# Patient Record
Sex: MALE | Race: WHITE | NOT HISPANIC OR LATINO | Employment: UNEMPLOYED | RURAL
[De-identification: names, ages, dates, MRNs, and addresses within clinical notes are randomized per-mention and may not be internally consistent; named-entity substitution may affect disease eponyms.]

---
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
## 2024-04-02 ENCOUNTER — OFFICE VISIT (OUTPATIENT)
Dept: FAMILY MEDICINE | Facility: CLINIC | Age: 19
End: 2024-04-02
Payer: MEDICAID

## 2024-04-02 VITALS
SYSTOLIC BLOOD PRESSURE: 109 MMHG | HEART RATE: 94 BPM | OXYGEN SATURATION: 97 % | RESPIRATION RATE: 20 BRPM | TEMPERATURE: 99 F | BODY MASS INDEX: 16.86 KG/M2 | HEIGHT: 69 IN | WEIGHT: 113.81 LBS | DIASTOLIC BLOOD PRESSURE: 69 MMHG

## 2024-04-02 DIAGNOSIS — J02.9 PHARYNGITIS, UNSPECIFIED ETIOLOGY: Primary | ICD-10-CM

## 2024-04-02 LAB
CTP QC/QA: YES
MOLECULAR STREP A: NEGATIVE

## 2024-04-02 PROCEDURE — 99213 OFFICE O/P EST LOW 20 MIN: CPT | Mod: ,,, | Performed by: NURSE PRACTITIONER

## 2024-04-02 PROCEDURE — 3078F DIAST BP <80 MM HG: CPT | Mod: CPTII,,, | Performed by: NURSE PRACTITIONER

## 2024-04-02 PROCEDURE — 87651 STREP A DNA AMP PROBE: CPT | Mod: RHCUB | Performed by: NURSE PRACTITIONER

## 2024-04-02 PROCEDURE — 3074F SYST BP LT 130 MM HG: CPT | Mod: CPTII,,, | Performed by: NURSE PRACTITIONER

## 2024-04-02 PROCEDURE — 3008F BODY MASS INDEX DOCD: CPT | Mod: CPTII,,, | Performed by: NURSE PRACTITIONER

## 2024-04-02 RX ORDER — CEFPROZIL 250 MG/1
250 TABLET, FILM COATED ORAL EVERY 12 HOURS
Qty: 14 TABLET | Refills: 0 | Status: SHIPPED | OUTPATIENT
Start: 2024-04-02

## 2024-04-02 NOTE — LETTER
April 2, 2024      [facility] [address] [place] [address]  Phone: [phone]  Fax: [phone]       Patient: Keely Manley   YOB: 2005  Date of Visit: 04/02/2024    To Whom It May Concern:    LILLIAN Manley  was at [facility] on 04/02/2024. The patient may return to work/school on 4/3/24 with no restrictions. If you have any questions or concerns, or if I can be of further assistance, please do not hesitate to contact me.    Sincerely,    CLAY Christianson

## 2024-04-02 NOTE — PATIENT INSTRUCTIONS
rapid strep is negative. Will treat with cefprozil 250mg bid #14, warm salt water gargles, throat lozenges, drink lots of  fluids.

## 2025-01-27 ENCOUNTER — OFFICE VISIT (OUTPATIENT)
Dept: FAMILY MEDICINE | Facility: CLINIC | Age: 20
End: 2025-01-27

## 2025-01-27 VITALS
RESPIRATION RATE: 18 BRPM | DIASTOLIC BLOOD PRESSURE: 66 MMHG | WEIGHT: 119.38 LBS | OXYGEN SATURATION: 100 % | HEART RATE: 86 BPM | BODY MASS INDEX: 17.68 KG/M2 | SYSTOLIC BLOOD PRESSURE: 109 MMHG | TEMPERATURE: 98 F | HEIGHT: 69 IN

## 2025-01-27 DIAGNOSIS — L30.9 ECZEMA, UNSPECIFIED TYPE: Primary | ICD-10-CM

## 2025-01-27 PROCEDURE — 99213 OFFICE O/P EST LOW 20 MIN: CPT | Mod: ,,, | Performed by: NURSE PRACTITIONER

## 2025-01-27 RX ORDER — TRIAMCINOLONE ACETONIDE 1 MG/G
CREAM TOPICAL 2 TIMES DAILY
Qty: 80 G | Refills: 0 | Status: SHIPPED | OUTPATIENT
Start: 2025-01-27

## 2025-01-27 NOTE — LETTER
January 27, 2025      [facility] [address] [place] [address]  Phone: [phone]  Fax: [phone]       Patient: Keely Manley   YOB: 2005  Date of Visit: 01/27/2025    To Whom It May Concern:    LILLIAN Manley  was at [facility] on 01/27/2025. The patient may return to work/school on 1/28/25 with no restrictions. If you have any questions or concerns, or if I can be of further assistance, please do not hesitate to contact me.    Sincerely,    CLAY Christianson

## 2025-01-27 NOTE — PROGRESS NOTES
CLAY Christianson   [facility]  [address]   [place] [address]     PATIENT NAME: Keely Manley  : 2005  DATE: 25  MRN: [id number]      Billing Provider: CLAY Christianson  Level of Service: [place] OFFICE/OUTPT VISIT, EST, LEVL III, 20-29 MIN  Patient PCP Information       Provider PCP Type    CLAY Christianson General              Reason for Visit / Chief Complaint: Rash (Itchy rash on hands noticed about 2 weeks before [place] and now it is on his face. It was raised when it first started and now it is just dry and itches)       History of Present Illness / Problem Focused Workflow     History of Present Illness    CHIEF COMPLAINT:  Patient presents today for rashes on hands and face.    HISTORY OF PRESENT ILLNESS:  He developed a rash on his hands and face approximately two weeks before [place]. The rash has since spread to his ears and is beginning to affect his eyelids. 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 Despite implementing protective measures including industrial style Nitrol gloves and covering up, the rash has persisted. He denies any history of eczema or recent changes in products used.    CURRENT SYMPTOMS:  He presents with scattered rough red patches on right hand with one palm bump and right wrist involvement. Left hand shows large red scaly patch across entire dorsum and thumb. Facial involvement includes red scaly rash under left eye, in left nasal fold, across right cheekbone, and right nasolabial fold. Both ears are affected.    CURRENT TREATMENT:  He uses Gold Bond psoriasis medicated cream as needed during rash flares.      ROS:  General: -fever, -chills, -fatigue, -weight gain, -weight loss  Eyes: -vision changes, -redness, -discharge  ENT: -ear pain, -nasal congestion, -sore throat  Cardiovascular: -chest pain, -palpitations, -lower extremity  "edema  Respiratory: -cough, -shortness of breath  Gastrointestinal: -abdominal pain, -nausea, -vomiting, -diarrhea, -constipation, -blood in stool  Genitourinary: -dysuria, -hematuria, -frequency  Musculoskeletal: -joint pain, -muscle pain  Skin: +rash, +rash, -lesion  Neurological: -headache, -dizziness, -numbness, -tingling  Psychiatric: -anxiety, -depression, -sleep difficulty           Medical / Social / Family History     Past Medical History:   Diagnosis Date    Anxiety        History reviewed. No pertinent surgical history.    Social History    reports that he has never smoked. He has been exposed to tobacco smoke. He has never used smokeless tobacco. He reports that he does not drink alcohol and does not use drugs.    Family History  's family history includes No Known Problems in his father and mother.    Medications and Allergies     Medications  No outpatient medications have been marked as taking for the 1/27/25 encounter (Office Visit) with Merary Solorzano FNP.       Allergies  Review of patient's allergies indicates:   Allergen Reactions    Augmentin [amoxicillin-pot clavulanate] Nausea And Vomiting       Physical Examination     Vitals:    01/27/25 1553   BP: 109/66   Pulse: 86   Resp: 18   Temp: 98.4 °F (36.9 °C)   TempSrc: Oral   SpO2: 100%   Weight: 54.2 kg (119 lb 6.4 oz)   Height: 5' 9" (1.753 m)        Physical Exam    General: No acute distress. Well-developed. Well-nourished.  Eyes: EOMI. Sclerae anicteric.  HENT: Normocephalic. Atraumatic. Nares patent. Moist oral mucosa.  Ears: Bilateral TMs clear. Bilateral EACs clear.  Cardiovascular: Regular rate. Regular rhythm. No murmurs. No rubs. No gallops. Normal S1, S2.  Respiratory: Normal respiratory effort. Clear to auscultation bilaterally. No rales. No rhonchi. No wheezing.  Abdomen: Soft. Non-tender. Non-distended. Normoactive bowel sounds.  Musculoskeletal: No  obvious deformity.  Extremities: No lower extremity edema.  Neurological: " Alert & oriented x3. No slurred speech. Normal gait.  Psychiatric: Normal mood. Normal affect. Good insight. Good judgment.  Skin: Warm. Scattered rough erythematous patches on back of right hand. Erythematous plaques. Scaly skin. Erythematous scaly rash on left cheek under eye and nasolabial fold. Erythematous scaly rash on right cheek and nasolabial fold. Erythematous scaly rash on eyelid. Erythematous scaly rash on ears. Erythematous bump on palm of right hand.       Physical Exam     Assessment and Plan (including Health Maintenance)     :Assessment & Plan    IMPRESSION:  - Assessed rash on hands, face, and ears as likely eczema, exacerbated by dry winter weather and frequent hand washing  - Considered potential occupational exposure to coolant as contributing factor, but symptoms more consistent with eczema presentation  - Decided on combination topical steroid and moisturizer treatment to address inflammation and dryness    ECZEMA:  - Evaluated the patient's condition, observing scattered rough red patches on the back of the right hand, a large red scaly patch on the left hand, and red scaly rashes on both cheeks, nasolabial folds, and ears.  - Diagnosed the condition as eczema, noting its tendency to worsen in dry, cool weather and with heat exposure.  - Educated the patient about the importance of proper moisturizing routine for eczema management.  - Instructed on proper application of topical medications, emphasizing application to damp skin for better absorption.  - Prescribed a topical steroid cream to be mixed with OTC moisturizer (Eucerin, CeraVe, or Aquaphor): - Mix equal parts of prescribed 80g steroid cream with chosen moisturizer - Apply thin layer to affected areas 2 times daily, preferably once after bathing  - Recommend continued use of OTC Gold Bond lotion as needed between medicated cream applications.  - Noted that the patient reports rashes on hands and face that started about 2 weeks before  [place] and keep recurring despite initial attempts to cover up.  - Observed red, scaly patches on hands, face, and ears during exam.  - Assessed the condition as likely eczema, noting its tendency to worsen in dry and cool weather.  - Prescribed a mixture of steroid cream and moisturizer to be applied twice daily.  - Recommend additional moisturizing with Gold Bond lotion between prescribed treatments.  - Recommend wearing cotton glove liners under work gloves to wick moisture and protect skin.  - Advised removing gloves regularly to dry hands during work.  - Patient to apply moisturizer after hand washing.    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  - Acknowledged the possibility of developing allergies to workplace substances.  - Work excuse provided for current day (January 27).    FOLLOW UP:  - Advised the patient to contact the office if condition worsens or does not improve in a few days.             Problem List Items Addressed This Visit       Eczema - Primary   .      Health Maintenance Due   Topic Date Due    Hepatitis C Screening  Never done    Lipid Panel  Never done    HIV Screening  Never done    HPV Vaccines (1 - Male 3-dose series) Never done    TETANUS VACCINE  Never done    COVID-19 Vaccine (1 - 2024-25 season) Never done     Health Maintenance Topics with due status: Not Due       Topic Last Completion Date    RSV Vaccine (Age 60+ and Pregnant patients) Not Due       Procedures     No future appointments.     Follow up if symtptoms not improving.     Signature:  CLAY Christianson    Date of encounter: 1/27/25      This note was generated with the assistance of ambient listening technology. Verbal consent was obtained by the patient and accompanying visitor(s) for the recording of patient appointment to facilitate this note. I attest to having reviewed and edited the generated note for  accuracy, though some syntax or spelling errors may persist. Please contact the author of this note for any clarification.

## 2025-03-21 ENCOUNTER — OFFICE VISIT (OUTPATIENT)
Dept: FAMILY MEDICINE | Facility: CLINIC | Age: 20
End: 2025-03-21
Payer: COMMERCIAL

## 2025-03-21 VITALS
HEIGHT: 69 IN | OXYGEN SATURATION: 100 % | SYSTOLIC BLOOD PRESSURE: 97 MMHG | BODY MASS INDEX: 17.15 KG/M2 | RESPIRATION RATE: 18 BRPM | DIASTOLIC BLOOD PRESSURE: 61 MMHG | WEIGHT: 115.81 LBS | HEART RATE: 87 BPM | TEMPERATURE: 98 F

## 2025-03-21 DIAGNOSIS — L30.8 OTHER ECZEMA: Primary | ICD-10-CM

## 2025-03-21 NOTE — LETTER
March 21, 2025      [facility] [address] [place] [address]  Phone: [phone]  Fax: [phone]       Patient: Keely Manley   YOB: 2005  Date of Visit: 03/21/2025    To Whom It May Concern:    LILLIAN Manley  was at [facility] on 03/21/2025. The patient may return to work/school on 3/22/25  with no restrictions. If you have any questions or concerns, or if I can be of further assistance, please do not hesitate to contact me.    Sincerely,    CLAY Christianson

## 2025-03-21 NOTE — PROGRESS NOTES
CLAY Christianson   [facility]  [address]   [place] [address]     PATIENT NAME: Keely Manley  : 2005  DATE: 3/21/25  MRN: [id number]      Billing Provider: CLAY Christianson  Level of Service:   Patient PCP Information       Provider PCP Type    CLAY Christianson General            Reason for Visit / Chief Complaint: Rash (Has been treated for eczema on his hands but they have gotten worse. The Kenalog cream is not helping.) and Health Maintenance (Hepatitis C Screening Never done/Lipid Panel Never done/HIV Screening Never done/HPV Vaccines(1 - Male 3-dose series) Never done/TETANUS VACCINE Never done/COVID-19 Vaccine( season) Never done )         History of Present Illness / Problem Focused Workflow     Keely Manley is a 19 y.o. male presents to the clinic  with recurrent  eczema of his hands. Steroid cream initially helped but has worsened and now spread.       Review of Systems     Review of Systems     Medical / Social / Family History     Past Medical History:   Diagnosis Date    Anxiety        History reviewed. No pertinent surgical history.    Social History    reports that he has never smoked. He has been exposed to tobacco smoke. He has never used smokeless tobacco. He reports that he does not drink alcohol and does not use drugs.    Family History  's family history includes No Known Problems in his father and mother.    Medications and Allergies     Medications  Outpatient Medications Marked as Taking for the 3/21/25 encounter (Office Visit) with Merary Solorzano FNP   Medication Sig Dispense Refill    triamcinolone acetonide 0.1% (KENALOG) 0.1 % cream Apply topically 2 (two) times daily. Pt will need to mix with equal parts of eucerin cream and apply to affected areas of face and hands twice daily 80 g 0       Allergies  Review of patient's allergies indicates:   Allergen Reactions    Augmentin [amoxicillin-pot clavulanate] Nausea And Vomiting       Physical  "Examination     Vitals:    03/21/25 0853   BP: 97/61   Pulse: 87   Resp: 18   Temp: 98.1 °F (36.7 °C)   TempSrc: Oral   SpO2: 100%   Weight: 52.5 kg (115 lb 12.8 oz)   Height: 5' 9" (1.753 m)      Physical Exam    General:  alert, oriented in NAD.  CV  RRR  Lungs CTA bilateral  Skin:  scaly red patches with desquamation noted on PIPs of both hands.        Assessment and Plan (including Health Maintenance)     :    Plan:  will refer to dermatology for further eval for psoriasis and upgraded treatment.  Will go ahead  and treat with steroid prior  to visit with dermatologist.  He will stop steroids 2 weeks prior to derm vis.  Eliminate all red dyes and other chemicals he may come in contact with and moisturize regularly.           Health Maintenance Due   Topic Date Due    Hepatitis C Screening  Never done    Lipid Panel  Never done    HIV Screening  Never done    HPV Vaccines (1 - Male 3-dose series) Never done    TETANUS VACCINE  Never done    COVID-19 Vaccine (1 - 2024-25 season) Never done       Problem List Items Addressed This Visit    None  .  There are no diagnoses linked to this encounter.   Health Maintenance Topics with due status: Not Due       Topic Last Completion Date    RSV Vaccine (Age 60+ and Pregnant patients) Not Due       Procedures     No future appointments.     No follow-ups on file.     Signature:  CLAY Christianson    Date of encounter: 3/21/25    "

## 2025-06-06 ENCOUNTER — OFFICE VISIT (OUTPATIENT)
Dept: FAMILY MEDICINE | Facility: CLINIC | Age: 20
End: 2025-06-06
Payer: COMMERCIAL

## 2025-06-06 VITALS
WEIGHT: 117 LBS | RESPIRATION RATE: 18 BRPM | HEIGHT: 69 IN | DIASTOLIC BLOOD PRESSURE: 86 MMHG | SYSTOLIC BLOOD PRESSURE: 110 MMHG | TEMPERATURE: 99 F | BODY MASS INDEX: 17.33 KG/M2 | HEART RATE: 81 BPM | OXYGEN SATURATION: 98 %

## 2025-06-06 DIAGNOSIS — R21 RASH: Primary | ICD-10-CM

## 2025-06-06 DIAGNOSIS — L30.9 ECZEMA, UNSPECIFIED TYPE: ICD-10-CM

## 2025-06-06 LAB
ALBUMIN SERPL BCP-MCNC: 4.8 G/DL (ref 3.5–5)
ALBUMIN/GLOB SERPL: 1.8 {RATIO}
ALP SERPL-CCNC: 43 U/L (ref 40–150)
ALT SERPL W P-5'-P-CCNC: 21 U/L
ANION GAP SERPL CALCULATED.3IONS-SCNC: 11 MMOL/L (ref 7–16)
AST SERPL W P-5'-P-CCNC: 23 U/L (ref 11–45)
BASOPHILS # BLD AUTO: 0.09 K/UL (ref 0–0.2)
BASOPHILS NFR BLD AUTO: 1 % (ref 0–1)
BILIRUB SERPL-MCNC: 1 MG/DL
BUN SERPL-MCNC: 12 MG/DL (ref 9–21)
BUN/CREAT SERPL: 12 (ref 6–20)
CALCIUM SERPL-MCNC: 9.8 MG/DL (ref 8.4–10.2)
CHLORIDE SERPL-SCNC: 104 MMOL/L (ref 98–107)
CO2 SERPL-SCNC: 30 MMOL/L (ref 22–29)
CREAT SERPL-MCNC: 1.01 MG/DL (ref 0.72–1.25)
CRP SERPL HS-MCNC: 3.45 MG/L
DIFFERENTIAL METHOD BLD: ABNORMAL
EGFR (NO RACE VARIABLE) (RUSH/TITUS): 109 ML/MIN/1.73M2
EOSINOPHIL # BLD AUTO: 0.27 K/UL (ref 0–0.5)
EOSINOPHIL NFR BLD AUTO: 2.9 % (ref 1–4)
ERYTHROCYTE [DISTWIDTH] IN BLOOD BY AUTOMATED COUNT: 12.6 % (ref 11.5–14.5)
ERYTHROCYTE [SEDIMENTATION RATE] IN BLOOD BY WESTERGREN METHOD: 5 MM/HR (ref 0–15)
GLOBULIN SER-MCNC: 2.7 G/DL (ref 2–4)
GLUCOSE SERPL-MCNC: 77 MG/DL (ref 74–100)
HCT VFR BLD AUTO: 45 % (ref 40–54)
HGB BLD-MCNC: 15.2 G/DL (ref 13.5–18)
IMM GRANULOCYTES # BLD AUTO: 0.02 K/UL (ref 0–0.04)
IMM GRANULOCYTES NFR BLD: 0.2 % (ref 0–0.4)
LYMPHOCYTES # BLD AUTO: 3.02 K/UL (ref 1–4.8)
LYMPHOCYTES NFR BLD AUTO: 32.3 % (ref 27–41)
MCH RBC QN AUTO: 31 PG (ref 27–31)
MCHC RBC AUTO-ENTMCNC: 33.8 G/DL (ref 32–36)
MCV RBC AUTO: 91.6 FL (ref 80–96)
MONOCYTES # BLD AUTO: 0.84 K/UL (ref 0–0.8)
MONOCYTES NFR BLD AUTO: 9 % (ref 2–6)
MPC BLD CALC-MCNC: 9.8 FL (ref 9.4–12.4)
NEUTROPHILS # BLD AUTO: 5.12 K/UL (ref 1.8–7.7)
NEUTROPHILS NFR BLD AUTO: 54.6 % (ref 53–65)
NRBC # BLD AUTO: 0 X10E3/UL
NRBC, AUTO (.00): 0 %
PLATELET # BLD AUTO: 282 K/UL (ref 150–400)
POTASSIUM SERPL-SCNC: 3.7 MMOL/L (ref 3.5–5.1)
PROT SERPL-MCNC: 7.5 G/DL (ref 6.4–8.3)
RBC # BLD AUTO: 4.91 M/UL (ref 4.6–6.2)
RHEUMATOID FACT SER NEPH-ACNC: NEGATIVE [IU]/ML
SODIUM SERPL-SCNC: 141 MMOL/L (ref 136–145)
TSH SERPL DL<=0.005 MIU/L-ACNC: 0.81 UIU/ML (ref 0.35–4.94)
WBC # BLD AUTO: 9.36 K/UL (ref 4.5–11)

## 2025-06-06 PROCEDURE — 80050 GENERAL HEALTH PANEL: CPT | Mod: ,,, | Performed by: CLINICAL MEDICAL LABORATORY

## 2025-06-06 PROCEDURE — 86141 C-REACTIVE PROTEIN HS: CPT | Mod: ,,, | Performed by: CLINICAL MEDICAL LABORATORY

## 2025-06-06 PROCEDURE — 86430 RHEUMATOID FACTOR TEST QUAL: CPT | Mod: ,,, | Performed by: CLINICAL MEDICAL LABORATORY

## 2025-06-06 PROCEDURE — 85651 RBC SED RATE NONAUTOMATED: CPT | Mod: ,,, | Performed by: CLINICAL MEDICAL LABORATORY

## 2025-06-06 RX ORDER — PREDNISONE 20 MG/1
20 TABLET ORAL DAILY
Qty: 5 TABLET | Refills: 0 | Status: SHIPPED | OUTPATIENT
Start: 2025-06-06 | End: 2025-06-11

## 2025-06-06 RX ORDER — TRIAMCINOLONE ACETONIDE 1 MG/G
CREAM TOPICAL 2 TIMES DAILY
Qty: 80 G | Refills: 0 | Status: SHIPPED | OUTPATIENT
Start: 2025-06-06 | End: 2025-06-06 | Stop reason: SDUPTHER

## 2025-06-06 RX ORDER — CLOBETASOL PROPIONATE 0.5 MG/G
CREAM TOPICAL 2 TIMES DAILY
Qty: 30 G | Refills: 0 | Status: SHIPPED | OUTPATIENT
Start: 2025-06-06

## 2025-06-06 RX ORDER — TRIAMCINOLONE ACETONIDE 1 MG/G
CREAM TOPICAL 2 TIMES DAILY
Qty: 80 G | Refills: 0 | Status: SHIPPED | OUTPATIENT
Start: 2025-06-06

## 2025-06-09 ENCOUNTER — RESULTS FOLLOW-UP (OUTPATIENT)
Dept: FAMILY MEDICINE | Facility: CLINIC | Age: 20
End: 2025-06-09

## 2025-06-09 NOTE — PROGRESS NOTES
Please let patient know that his electrolytes, kidney function, and liver enzymes are normal. His blood count is also normal. His thyroid level is normal. His inflammatory markers are reassuring. His lupus screen is still pending. We will update him when that returns.

## 2025-06-10 NOTE — PROGRESS NOTES
Please let patient know his Lupus screen was negative. His rash is likely due to eczema or allergic reaction. Did he get any relief with the new steroid cream and oral steroids?

## 2025-06-18 ENCOUNTER — TELEPHONE (OUTPATIENT)
Dept: DERMATOLOGY | Facility: CLINIC | Age: 20
End: 2025-06-18
Payer: COMMERCIAL

## 2025-06-30 ENCOUNTER — PATIENT MESSAGE (OUTPATIENT)
Dept: FAMILY MEDICINE | Facility: CLINIC | Age: 20
End: 2025-06-30
Payer: COMMERCIAL

## 2025-07-03 ENCOUNTER — OFFICE VISIT (OUTPATIENT)
Dept: FAMILY MEDICINE | Facility: CLINIC | Age: 20
End: 2025-07-03
Payer: COMMERCIAL

## 2025-07-03 VITALS
TEMPERATURE: 98 F | RESPIRATION RATE: 18 BRPM | DIASTOLIC BLOOD PRESSURE: 72 MMHG | HEIGHT: 69 IN | OXYGEN SATURATION: 98 % | WEIGHT: 114.63 LBS | HEART RATE: 86 BPM | BODY MASS INDEX: 16.98 KG/M2 | SYSTOLIC BLOOD PRESSURE: 116 MMHG

## 2025-07-03 DIAGNOSIS — L30.9 ECZEMA, UNSPECIFIED TYPE: Primary | ICD-10-CM

## 2025-07-03 PROCEDURE — 99213 OFFICE O/P EST LOW 20 MIN: CPT | Mod: ,,, | Performed by: NURSE PRACTITIONER

## 2025-07-03 RX ORDER — PREDNISONE 20 MG/1
20 TABLET ORAL DAILY
Qty: 10 TABLET | Refills: 0 | Status: SHIPPED | OUTPATIENT
Start: 2025-07-03

## 2025-07-03 RX ORDER — CETIRIZINE HYDROCHLORIDE 10 MG/1
10 TABLET ORAL DAILY
Qty: 30 TABLET | Refills: 1 | Status: SHIPPED | OUTPATIENT
Start: 2025-07-03 | End: 2026-07-03

## 2025-07-03 NOTE — PROGRESS NOTES
CLAY Lawler   [facility] [address] [place] [address]  [phone]      PATIENT NAME: Keely Manley  : 2005  DATE: 7/3/25  MRN: [id number]      Billing Provider: CLAY Lawler  Level of Service:   Patient PCP Information       Provider PCP Type    CLAY [facility]            History of Present Illness    HPI:  Patient has been having eczema on bilateral hands for several weeks. He was prescribed clobetasol and triamcinolone for treatment, and completed a 5-day course of prednisone. The prednisone initially provided relief, but the condition of his hands has since deteriorated. He reports that clobetasol is effective, but believes another course of prednisone would be more beneficial based on his previous response. The eczema is causing significant irritation and affecting his daily activities. He has an upcoming dermatology appointment scheduled for October, but is seeking immediate relief due to the severity of his symptoms.      ROS:  General: -fever, -chills, -fatigue, -weight gain, -weight loss  Eyes: -vision changes, -redness, -discharge  ENT: -ear pain, -nasal congestion, -sore throat  Cardiovascular: -chest pain, -palpitations, -lower extremity edema  Respiratory: -cough, -shortness of breath  Gastrointestinal: -abdominal pain, -nausea, -vomiting, -diarrhea, -constipation, -blood in stool  Genitourinary: -dysuria, -hematuria, -frequency  Musculoskeletal: -joint pain, -muscle pain  Skin: -rash, +lesion  Neurological: -headache, -dizziness, -numbness, -tingling  Psychiatric: -anxiety, -depression, -sleep difficulty          Medications and Allergies     Medications  Outpatient Medications Marked as Taking for the 7/3/25 encounter (Office Visit) with Mel Royal FNP   Medication Sig Dispense Refill    clobetasoL (TEMOVATE) 0.05 % cream Apply topically 2 (two) times daily. 30 g 0    triamcinolone acetonide 0.1%  (KENALOG) 0.1 % cream Apply topically 2 (two) times daily. Pt will need to mix with equal parts of eucerin cream and apply to affected areas of face and hands twice daily 80 g 0       Allergies  Review of patient's allergies indicates:   Allergen Reactions    Augmentin [amoxicillin-pot clavulanate] Nausea And Vomiting       Physical Exam  Constitutional:       General: He is not in acute distress.  HENT:      Nose: Nose normal.      Mouth/Throat:      Mouth: Mucous membranes are moist.   Eyes:      Extraocular Movements: Extraocular movements intact.   Cardiovascular:      Rate and Rhythm: Normal rate.   Pulmonary:      Effort: Pulmonary effort is normal. No respiratory distress.   Abdominal:      General: Bowel sounds are normal.      Palpations: Abdomen is soft.      Tenderness: There is no abdominal tenderness.   Musculoskeletal:         General: Normal range of motion.      Cervical back: Normal range of motion.   Skin:     General: Skin is warm.      Findings: Lesion (irritation and open areas to bilateral hands) present.   Neurological:      Mental Status: He is alert and oriented to person, place, and time.   Psychiatric:         Behavior: Behavior normal.          Assessment & Plan    IMPRESSION:  - Assessed ongoing eczema affecting lateral hands, previously treated with clobetasol, triamcinolone, and a course of prednisone.  - Initiated multi-faceted approach combining topical steroids, oral antihistamine, and a short course of low-dose oral prednisone.    ECZEMA (OTHER SPECIFIED DERMATITIS):  - Monitored patient's ongoing eczema on lateral hands for several weeks, which initially improved with prednisone but has worsened again.  - Hands are very irritated, affecting him significantly.  - Continue clobetasol cream and recommended OTC hydrocortisone eczema cream for topical application, to be alternated.  - Prescribed Zyrtec daily for eczema management.  - Advised patient to wear gloves at night to help  retain moisture in hands, which is important for eczema management.  - Noted the patient has a dermatology appointment scheduled for October.    STEROID USE:  - Noted the patient was previously on a 5-day course of prednisone.  - Prescribed a short course of low-dose prednisone PO for current exacerbation.    FOLLOW-UP:  - Follow up as needed if symptoms worsen or fail to improve.          Health Maintenance Due   Topic Date Due    Hepatitis C Screening  Never done    Lipid Panel  Never done    HIV Screening  Never done    COVID-19 Vaccine (1 - 2024-25 season) Never done       Problem List Items Addressed This Visit          Derm    Eczema - Primary    Relevant Medications    predniSONE (DELTASONE) 20 MG tablet       Health Maintenance Topics with due status: Not Due       Topic Last Completion Date    TETANUS VACCINE 07/11/2016    Influenza Vaccine 01/27/2025    RSV Vaccine (Age 60+ and Pregnant patients) Not Due       Future Appointments   Date Time Provider Department Center   10/7/2025  2:30 PM Karma Brewer MD [facility]        This note was generated with the assistance of ambient listening technology. Verbal consent was obtained by the patient and accompanying visitor(s) for the recording of patient appointment to facilitate this note. I attest to having reviewed and edited the generated note for accuracy, though some syntax or spelling errors may persist. Please contact the author of this note for any clarification.     Signature:  LCAY Lawler  [facility] [address] [place] [address]  [phone]    Date of encounter: 7/3/25

## 2025-09-04 ENCOUNTER — OFFICE VISIT (OUTPATIENT)
Dept: FAMILY MEDICINE | Facility: CLINIC | Age: 20
End: 2025-09-04
Payer: COMMERCIAL

## 2025-09-04 VITALS
SYSTOLIC BLOOD PRESSURE: 110 MMHG | WEIGHT: 115 LBS | OXYGEN SATURATION: 98 % | TEMPERATURE: 98 F | DIASTOLIC BLOOD PRESSURE: 80 MMHG | BODY MASS INDEX: 17.03 KG/M2 | RESPIRATION RATE: 18 BRPM | HEIGHT: 69 IN | HEART RATE: 87 BPM

## 2025-09-04 DIAGNOSIS — U07.1 COVID-19: Primary | ICD-10-CM

## 2025-09-04 DIAGNOSIS — J02.9 SORE THROAT: ICD-10-CM

## 2025-09-04 LAB
CTP QC/QA: YES
CTP QC/QA: YES
MOLECULAR STREP A: NEGATIVE
SARS-COV-2 RDRP RESP QL NAA+PROBE: POSITIVE

## 2025-09-04 PROCEDURE — 99213 OFFICE O/P EST LOW 20 MIN: CPT | Mod: ,,, | Performed by: STUDENT IN AN ORGANIZED HEALTH CARE EDUCATION/TRAINING PROGRAM

## 2025-09-04 PROCEDURE — 87651 STREP A DNA AMP PROBE: CPT | Mod: QW,,, | Performed by: STUDENT IN AN ORGANIZED HEALTH CARE EDUCATION/TRAINING PROGRAM

## 2025-09-04 PROCEDURE — 87635 SARS-COV-2 COVID-19 AMP PRB: CPT | Mod: QW,,, | Performed by: STUDENT IN AN ORGANIZED HEALTH CARE EDUCATION/TRAINING PROGRAM

## 2025-09-04 RX ORDER — BENZONATATE 100 MG/1
100 CAPSULE ORAL 3 TIMES DAILY PRN
Qty: 30 CAPSULE | Refills: 0 | Status: SHIPPED | OUTPATIENT
Start: 2025-09-04 | End: 2025-09-14

## 2025-09-04 RX ORDER — IPRATROPIUM BROMIDE 21 UG/1
2 SPRAY, METERED NASAL 2 TIMES DAILY
Qty: 30 ML | Refills: 0 | Status: SHIPPED | OUTPATIENT
Start: 2025-09-04